# Patient Record
Sex: FEMALE | Race: WHITE
[De-identification: names, ages, dates, MRNs, and addresses within clinical notes are randomized per-mention and may not be internally consistent; named-entity substitution may affect disease eponyms.]

---

## 2018-05-18 ENCOUNTER — HOSPITAL ENCOUNTER (EMERGENCY)
Dept: HOSPITAL 31 - C.ER | Age: 33
Discharge: HOME | End: 2018-05-18
Payer: MEDICAID

## 2018-05-18 VITALS
DIASTOLIC BLOOD PRESSURE: 76 MMHG | RESPIRATION RATE: 18 BRPM | HEART RATE: 79 BPM | SYSTOLIC BLOOD PRESSURE: 109 MMHG | TEMPERATURE: 98.5 F

## 2018-05-18 VITALS — OXYGEN SATURATION: 98 %

## 2018-05-18 DIAGNOSIS — Z72.0: ICD-10-CM

## 2018-05-18 DIAGNOSIS — N39.0: Primary | ICD-10-CM

## 2018-05-18 LAB
BACTERIA #/AREA URNS HPF: (no result) /[HPF]
BILIRUB UR-MCNC: NEGATIVE MG/DL
GLUCOSE UR STRIP-MCNC: NORMAL MG/DL
HCG,QUALITATIVE URINE: NEGATIVE
LEUKOCYTE ESTERASE UR-ACNC: (no result) LEU/UL
PH UR STRIP: 5 [PH] (ref 5–8)
PROT UR STRIP-MCNC: NEGATIVE MG/DL
RBC # UR STRIP: (no result) /UL
SP GR UR STRIP: 1.02 (ref 1–1.03)
SQUAMOUS EPITHIAL: 13 /HPF (ref 0–5)
UROBILINOGEN UR-MCNC: NORMAL MG/DL (ref 0.2–1)

## 2018-05-18 NOTE — C.PDOC
History Of Present Illness


32 year old female presents to the emergency department with complaints of 

painful urination and urinary frequency since this morning. Patient reports 

experiencing similar symptoms in the past with a urinary infection. PT notes 

she is currently ending her menses.  Denies back pain, fever, n/v, vaginal 

discharge, or rash. 


Time Seen by Provider: 05/18/18 18:22


Chief Complaint (Nursing): Female Genitourinary


History Per: Patient


History/Exam Limitations: no limitations


Onset/Duration Of Symptoms: Hrs


Current Symptoms Are (Timing): Still Present


Quality Of Discomfort: Cramping, "Pain"


Associated Symptoms: Other (abdominal pain and cramping).  denies: Back Pain





Past Medical History


Reviewed: Historical Data, Nursing Documentation, Vital Signs


Vital Signs: 


 Last Vital Signs











Temp  98.5 F   05/18/18 19:08


 


Pulse  79   05/18/18 19:08


 


Resp  18   05/18/18 19:08


 


BP  109/76   05/18/18 19:08


 


Pulse Ox  98   05/18/18 19:42














- Medical History


PMH: Anemia


Surgical History: No Surg Hx





- [facility] Procedures








INJECT/INFUSE NEC (01/22/15)


MANUAL ASSIST DELIV NEC (11/13/13)








Family History: States: Unknown Family Hx





- Social History


Hx Tobacco Use: Yes


Hx Alcohol Use: Yes


Hx Substance Use: No





- Immunization History


Hx Tetanus Toxoid Vaccination: No


Hx Influenza Vaccination: No


Hx Pneumococcal Vaccination: No





Review Of Systems


Except As Marked, All Systems Reviewed And Found Negative.


Gastrointestinal: Positive for: Abdominal Pain


Musculoskeletal: Negative for: Back Pain





Physical Exam





- Physical Exam


Appears: Well, Non-toxic, No Acute Distress


Skin: Normal Color, Warm, Dry


Head: Atraumatic, Normacephalic


Eye(s): bilateral: Normal Inspection, EOMI


Nose: Normal


Oral Mucosa: Moist


Neck: Normal ROM, Supple


Chest: Symmetrical


Respiratory: No Accessory Muscle Use


Gastrointestinal/Abdominal: Soft, Tenderness (suprapubic)


Back: No CVA Tenderness, No Vertebral Tenderness


Extremity: Normal ROM


Neurological/Psych: Oriented x3, Normal Speech, Normal Cognition





ED Course And Treatment


O2 Sat by Pulse Oximetry: 98 (RA)


Pulse Ox Interpretation: Normal


Progress Note: Plan:  Macrobid 100mg PO.  Pyridium 100mg PO.  Urine Culture.  

Urinalysis.  PT was instructed to follow up with PMD in 1-2 days or return to 

ER if symtpoms persist or worsen.





Disposition





- Disposition


Disposition: HOME/ ROUTINE


Disposition Time: 18:54


Condition: STABLE


Additional Instructions: 


Follow up with your primary medical doctor or clinic in 2-5 days for further 

evaluation. Take medications as prescribed. Return to the emergency department 

at any time if symptoms persist or worsen.


Prescriptions: 


Nitrofurantoin Macrocrystals [Macrobid] 1 cap PO BID #14 cap


Phenazopyridine HCl [Pyridium] 100 mg PO TID #6 tablet


Instructions:  Urinary Tract Infection, Adult (DC)


Forms:  [facility] (English)





- Clinical Impression


Clinical Impression: 


 UTI (urinary tract infection)








- PA / NP / Resident Statement


MD/DO has reviewed & agrees with the documentation as recorded.





- Scribe Statement


The provider has reviewed the documentation as recorded by the Scribe (Rogerio Barker)


All medical record entries made by the Scribe were at my direction and 

personally dictated by me. I have reviewed the chart and agree that the record 

accurately reflects my personal performance of the history, physical exam, 

medical decision making, and the department course for this patient. I have 

also personally directed, reviewed, and agree with the discharge instructions 

and disposition.

## 2019-04-09 ENCOUNTER — HOSPITAL ENCOUNTER (EMERGENCY)
Dept: HOSPITAL 42 - ED | Age: 34
Discharge: HOME | End: 2019-04-09
Payer: MEDICAID

## 2019-04-09 VITALS — TEMPERATURE: 97.6 F | OXYGEN SATURATION: 99 %

## 2019-04-09 VITALS — DIASTOLIC BLOOD PRESSURE: 69 MMHG | HEART RATE: 79 BPM | SYSTOLIC BLOOD PRESSURE: 114 MMHG | RESPIRATION RATE: 19 BRPM

## 2019-04-09 DIAGNOSIS — Z83.3: ICD-10-CM
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
## 2019-04-09 DIAGNOSIS — Z23: ICD-10-CM

## 2019-04-09 DIAGNOSIS — S61.412A: Primary | ICD-10-CM
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
## 2019-04-09 DIAGNOSIS — Z82.49: ICD-10-CM

## 2019-04-09 NOTE — ED PDOC
Arrival/HPI





- General


Chief Complaint: Abnormal Skin Integrity


Time Seen by Provider: 04/09/19 15:45


Historian: Patient





- History of Present Illness


Narrative History of Present Illness (Text): 





04/09/19 16:51


Patient is a 32yo F with no PMH presenting with L hand laceration. She was 

cutting an avocado at home when the knife when through it and cut her hand. She 

reports pain in the second and third digits of the left hand and swelling. She 

denies numbness, tingling, weakness, fever, chills, nausea, or vomiting. She 

also complains of headache. 








Past Medical History





- Cardiac


Hx Cardiac Disorders: No





- Pulmonary


Hx Respiratory Disorders: No





- Neurological


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





- Endocrine/Metabolic


Hx Endocrine Disorders: No





- Hematological/Oncological


Hx Blood Disorders: No





- Integumentary


Hx Dermatological Disorder: No





- Musculoskeletal/Rheumatological


Hx Musculoskeletal Disorders: No





- Gastrointestinal


Hx Gastrointestinal Disorders: No





- Genitourinary/Gynecological


Hx Genitourinary Disorders: No





- Psychiatric


Hx Psychophysiologic Disorder: No


Hx Substance Use: No





- Surgical History


Hx Inguinal Hernia Repair: Yes


Hx Tubal Ligation: Yes


Other/Comment: hernia repair





- Anesthesia


Hx Anesthesia: No





Family/Social History


Family/Social History: Diabetes, Hypertension, Neoplasm/Cancer


Smoking Status: Never Smoked


Hx Alcohol Use: Yes


Frequency of alcohol use: Socially


Hx Substance Use: No





Allergies/Home Meds


Allergies/Adverse Reactions: 


Allergies





Penicillins Allergy (Verified 04/09/19 16:41)


   RASH











Review of Systems





- Review of Systems


Constitutional: Fatigue.  absent: Fevers


Eyes: absent: Vision Changes


ENT: absent: Hearing Changes


Respiratory: absent: SOB


Cardiovascular: absent: Chest Pain


Gastrointestinal: absent: Abdominal Pain, Nausea, Vomiting


Musculoskeletal: absent: Arthralgias


Skin: Laceration


Neurological: absent: Focal Weakness





Physical Exam


Vital Signs Reviewed: Yes





Vital Signs











  Temp Pulse BP Pulse Ox


 


 04/09/19 16:15  97.6 F  66  118/72  99











Temperature: Afebrile


Blood Pressure: Normal


Pulse: Regular


Respiratory Rate: Normal


Appearance: Positive for: Well-Appearing, Non-Toxic, Comfortable


Pain Distress: None


Mental Status: Positive for: Alert and Oriented X 3





- Systems Exam


Head: Present: Atraumatic, Normocephalic


Pupils: Present: PERRL


Extroacular Muscles: Present: EOMI


Conjunctiva: Present: Normal


Mouth: Present: Moist Mucous Membranes


Nose (External): Present: Atraumatic


Nose (Internal): Present: Normal Inspection


Neck: Present: Normal Range of Motion


Respiratory/Chest: Present: Clear to Auscultation, Good Air Exchange.  No: 

Respiratory Distress, Accessory Muscle Use


Cardiovascular: Present: Regular Rate and Rhythm.  No: Murmurs, Normal S1, S2


Abdomen: Present: Normal Bowel Sounds.  No: Tenderness, Distention, Peritoneal 

Signs


Upper Extremity: Present: Normal ROM, Neurovascularly Intact, Norm 2-Pt 

Discrimination.  No: Cyanosis, Edema


Lower Extremity: Present: Normal Inspection.  No: Edema


Neurological: Present: GCS=15, CN II-XII Intact, Speech Normal


Skin: Present: Laceration (1cm laceration on L palm with minimal erythema. no 

bleeding noted. )


Psychiatric: Present: Alert, Oriented x 3, Normal Insight





Medical Decision Making


ED Course and Treatment: 





04/09/19 17:17


Patient given Tdap as she is not certain of her last immunization. Area was 

cleaned and dermabond was applied. 





- Medication Orders


Current Medication Orders: 











Tetanus/Reduced Diphtheria/Acell Pertussis (Boostrix Vaccine Inj)  0.5 ml IM 

.ONCE ONE


   Stop: 04/09/19 16:50











Disposition/Present on Arrival





- Present on Arrival


Any Indicators Present on Arrival: No


History of DVT/PE: No


History of Uncontrolled Diabetes: No


Urinary Catheter: No


History of Decub. Ulcer: No


History Surgical Site Infection Following: None





- Disposition


Have Diagnosis and Disposition been Completed?: Yes


Diagnosis: 


 Laceration





Disposition: HOME/ ROUTINE


Disposition Time: 17:18


Condition: IMPROVED


Discharge Instructions (ExitCare):  Laceration Repair With Glue (DC)


Additional Instructions: 


If you experience numbness, tingling, weakness, fever, nausea, vomiting, please 

return to the emergency department. 


Allow the dermabond to form a seal and fall off on its own. 


You may shower normall and pat the area dry.


Take ibuprofen or tylenol as needed for pain.  


Forms:  [employer] (English)